# Patient Record
Sex: MALE | Race: WHITE | NOT HISPANIC OR LATINO | URBAN - METROPOLITAN AREA
[De-identification: names, ages, dates, MRNs, and addresses within clinical notes are randomized per-mention and may not be internally consistent; named-entity substitution may affect disease eponyms.]

---

## 2022-08-02 ENCOUNTER — HOSPITAL ENCOUNTER (OUTPATIENT)
Dept: WOUND CARE | Facility: HOSPITAL | Age: 47
Discharge: HOME | End: 2022-08-02
Attending: EMERGENCY MEDICINE
Payer: COMMERCIAL

## 2022-08-02 VITALS
SYSTOLIC BLOOD PRESSURE: 173 MMHG | BODY MASS INDEX: 28.23 KG/M2 | TEMPERATURE: 99 F | RESPIRATION RATE: 18 BRPM | WEIGHT: 220 LBS | HEART RATE: 97 BPM | HEIGHT: 74 IN | DIASTOLIC BLOOD PRESSURE: 103 MMHG

## 2022-08-02 DIAGNOSIS — L73.9 FOLLICULITIS: Primary | ICD-10-CM

## 2022-08-02 PROCEDURE — 27000999 HC MEDICAL RECORD PHOTO DOCUMENTATION

## 2022-08-02 PROCEDURE — 99214 OFFICE O/P EST MOD 30 MIN: CPT

## 2022-08-02 RX ORDER — AMLODIPINE BESYLATE 10 MG/1
10 TABLET ORAL DAILY
COMMUNITY
Start: 2022-07-11

## 2022-08-02 RX ORDER — MUPIROCIN 20 MG/G
OINTMENT TOPICAL 2 TIMES DAILY
Qty: 1 G | Refills: 0 | Status: SHIPPED | OUTPATIENT
Start: 2022-08-02 | End: 2022-08-12

## 2022-08-02 RX ORDER — BUPRENORPHINE HYDROCHLORIDE 8 MG/1
1 TABLET SUBLINGUAL 3 TIMES DAILY
COMMUNITY
Start: 2022-07-12

## 2022-08-02 RX ORDER — POVIDONE-IODINE 100 MG/G
1 OINTMENT TOPICAL DAILY
COMMUNITY
Start: 2022-07-12

## 2022-08-02 RX ORDER — DEXTROAMPHETAMINE SACCHARATE, AMPHETAMINE ASPARTATE, DEXTROAMPHETAMINE SULFATE AND AMPHETAMINE SULFATE 7.5; 7.5; 7.5; 7.5 MG/1; MG/1; MG/1; MG/1
1 TABLET ORAL 2 TIMES DAILY
COMMUNITY
Start: 2022-07-12

## 2022-08-02 RX ORDER — ESOMEPRAZOLE MAGNESIUM 40 MG/1
40 CAPSULE, DELAYED RELEASE ORAL DAILY
COMMUNITY
Start: 2022-07-11

## 2022-08-02 NOTE — PROGRESS NOTES
"Subjective:       Patient ID: Bautista Silvestre is a 47 y.o. male.    Chief Complaint: No chief complaint on file.    48 y/o WM sent from [facility] to evaluate skin infection/pustules that is reportedly "staph" (unknown MRSA or MSSA). Pt states in mid June 2022, he broke out with these pustules and cellulitis. 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 Then he saw NP's at the above mentioned clinic who treataed with 3 antibiotics and topicals and the latter of doxy seemed to do the trick and now all resolving. Also used hibiclens scrub; did not use mupirocin in the nose but used it on the wounds.  Pt comes in today on 8/2/22 to be sure.  No fever/chills;      Review of Systems   Constitutional: Negative.    HENT: Negative.    Eyes: Negative.    Respiratory: Negative.    Cardiovascular: Negative.    Gastrointestinal: Negative.    Musculoskeletal: Negative.    Skin: Wound: see hpi.   Neurological: Negative.    Psychiatric/Behavioral: The patient is nervous/anxious.          Objective:      Vitals:    08/02/22 0847   BP: (!) 173/103   Pulse: 97   Resp: 18   Temp: 98.5 °F (36.9 °C)   Physical Exam  Constitutional:       General: He is not in acute distress.     Appearance: Normal appearance. He is not toxic-appearing.   HENT:      Head: Normocephalic and atraumatic.      Mouth/Throat:      Pharynx: Oropharynx is clear.   Eyes:      Conjunctiva/sclera: Conjunctivae normal.   Pulmonary:      Effort: Pulmonary effort is normal.   Musculoskeletal:         General: Normal range of motion.      Comments: Very muscular, shaves arms/legs  Has one resolving lesion on volar right forearm: dried papular entity; no flunctuance or ertheyma   Skin:     General: Skin is warm and dry.      Capillary Refill: Capillary refill takes less than 2 seconds.   Neurological:      General: No focal deficit present.      Mental Status: He is alert and oriented to person, place, and " time.              Wound 08/02/22 Abscess Right lower;proximal;posterior Arm #1 (Active)   08/02/22     Pre-existing: Yes   Primary Wound Type: Abscess   Side: Right   Orientation: lower;proximal;posterior   Location: Arm   Wound Number: #1   Ankle-Brachial Index:    Pulses:    Removal Indication and Assessment:    Wound Outcome:    (Retired) Wound Type:    (Retired) Wound Length (cm):    (Retired) Wound Width (cm):    (Retired) Depth (cm):    Wound Description (Comments):    Removal Indications:    Wound Image   08/02/22 0855   Dressing Appearance Dry;Open to air 08/02/22 0855   Drainage Amount None 08/02/22 0855   Appearance Pink;White 08/02/22 0855   Tissue loss description Partial thickness 08/02/22 0855   Black (%), Wound Tissue Color 0 % 08/02/22 0855   Red (%), Wound Tissue Color 50 % 08/02/22 0855   Yellow (%), Wound Tissue Color 50 % 08/02/22 0855   Periwound Area Intact;Dry 08/02/22 0855   Wound Edges Defined 08/02/22 0855   Wound Length (cm) 0.8 cm 08/02/22 0855   Wound Width (cm) 0.7 cm 08/02/22 0855   Wound Depth (cm) 0.1 cm 08/02/22 0855   Wound Volume (cm^3) 0.056 cm^3 08/02/22 0855   Wound Surface Area (cm^2) 0.56 cm^2 08/02/22 0855   Care Cleansed with:;Wound cleanser 08/02/22 0855           Assessment:       1. Folliculitis                Plan:     Plan of Care:    1. Spoke to pt and sig other. I reviewed records available in EHR  2. Sig other showed me pictures of what the wounds looked like on RUE and LLE; all seem healed now except for one papular entity which is dry on right volar forearm; nothing to gissel;   3. I will call in rx for nasal septum mupriocin: bid x 5 days; he already scrubbed with hibiclens and completed several rounds of antibiotics; no need for further oral antibiotics  4. No f/u needed here  5. Of note, BP elevated: pt says because he is nervous and agitated about the skin issues; will f/u with PCP       The time spent including preparing to see the patient, obtaining patient  history and assessment, evaluation of the plan of care, patient/caregiver counseling and education, orders, documentation, coordination of care, and other professional medical management activities for today's encounter was 30 minutes.

## 2022-08-02 NOTE — PATIENT INSTRUCTIONS
Pt seen today by: MANAV Castillo    Home health and self care DRESSING INSTRUCTIONS:        Wound location: rightarm    Cleanse wound with wound hibiclens soap and water  Apply mupirocin oint to both nostrils x5 days       Compression with: N/A    Return visit: call if you need to return for open wounds      Wound may have been debrided in clinic: if so, WHAT YOU NEED TO KNOW:    Debridement is the removal of infected, damaged, or dead tissue so a wound can heal properly. Your wound may need more than one debridement. Debridement can cause bleeding, and a small amount of blood is expected.  AFTER A DEBRIDEMENT:    Keep your wound clean and dry. Do not remove the dressing unless instructed.  Follow the wound care orders provided to you or your home health care provider.  If you have pain, take over the counter pain relievers or pain medication if prescribed.  Elevate the wound and limit excessive activity to prevent bleeding and/or swelling in your wound.  If you see blood coming through the dressing, apply gauze and tape over the dressing and hold firm pressure to the wound with your hand for 5-10 minutes continuously, without peeking, to help the bleeding stop.  Contact [facility] wound care team at [phone] or go to the nearest Emergency department if:    You have a fever greater than 101 taken by mouth.  Your pain gets worse or does not go away, even after taking your regular pain medicine.  Your skin around your wound is red, hot, swollen, or draining pus.  You have bleeding that continues to come through the dressing after holding pressure for 10 minutes     Nutrition:  The current daily value (%DV) for protein is 50 grams per day and is meant as a general goal for most people. Further increasing your dietary protein intake is very important for wound healing. Typically one needs over 100g of protein per day to help with wound healing needs.  If you are a dialysis patient or have problems with your kidneys, talk to  your Nephrologist about how much protein you can take in with your condition.  Examples of high protein items that can be added to your diet include: eggs, chicken, red meats, almonds, cottage cheese, Greek yogurt, beans, and peanut butter.  Fortified protein bars, shakes and drinks can add 15-30 additional grams of protein per serving.   Also add:   1 daily general multivitamin   Yon : 1 packet twice daily   Vitamin C : 500mg twice daily   Zinc 220 mg daily  Vit D : once daily      Call our [facility] wound clinic for questions/concerns a [phone] .